# Patient Record
Sex: FEMALE
[De-identification: names, ages, dates, MRNs, and addresses within clinical notes are randomized per-mention and may not be internally consistent; named-entity substitution may affect disease eponyms.]

---

## 2021-08-03 ENCOUNTER — RX ONLY (RX ONLY)
Age: 31
End: 2021-08-03

## 2021-08-03 ENCOUNTER — NEW SKIN PROBLEM (OUTPATIENT)
Dept: URBAN - METROPOLITAN AREA CLINIC 7 | Facility: CLINIC | Age: 31
Setting detail: DERMATOLOGY
End: 2021-08-03

## 2021-08-03 DIAGNOSIS — D48.5 NEOPLASM OF UNCERTAIN BEHAVIOR OF SKIN: ICD-10-CM

## 2021-08-03 PROCEDURE — 99204 OFFICE O/P NEW MOD 45 MIN: CPT

## 2021-08-03 RX ORDER — KETOCONAZOLE 20 MG/G
CREAM TOPICAL
Qty: 60 | Refills: 4
Start: 2021-08-03

## 2021-08-03 RX ORDER — BETAMETHASONE DIPROPIONATE 0.5 MG/G
CREAM TOPICAL
Qty: 45 | Refills: 0
Start: 2021-08-03

## 2022-02-08 ENCOUNTER — FOLLOW-UP (OUTPATIENT)
Dept: URBAN - METROPOLITAN AREA CLINIC 9 | Facility: CLINIC | Age: 32
Setting detail: DERMATOLOGY
End: 2022-02-08

## 2022-02-08 DIAGNOSIS — L57.0 ACTINIC KERATOSIS: ICD-10-CM

## 2022-02-08 PROCEDURE — 99214 OFFICE O/P EST MOD 30 MIN: CPT

## 2023-03-21 ENCOUNTER — APPOINTMENT (OUTPATIENT)
Dept: URBAN - METROPOLITAN AREA CLINIC 214 | Age: 33
Setting detail: DERMATOLOGY
End: 2023-03-24

## 2023-03-21 DIAGNOSIS — L60.3 NAIL DYSTROPHY: ICD-10-CM

## 2023-03-21 PROCEDURE — 99212 OFFICE O/P EST SF 10 MIN: CPT

## 2023-03-21 PROCEDURE — OTHER MIPS QUALITY: OTHER

## 2023-03-21 PROCEDURE — OTHER PRESCRIPTION: OTHER

## 2023-03-21 PROCEDURE — OTHER COUNSELING: OTHER

## 2023-03-21 RX ORDER — CICLOPIROX 80 MG/ML
SOLUTION TOPICAL
Qty: 6.6 | Refills: 3 | Status: ERX | COMMUNITY
Start: 2023-03-21

## 2023-03-21 ASSESSMENT — LOCATION DETAILED DESCRIPTION DERM: LOCATION DETAILED: LEFT THUMBNAIL

## 2023-03-21 ASSESSMENT — LOCATION SIMPLE DESCRIPTION DERM: LOCATION SIMPLE: LEFT THUMBNAIL

## 2023-03-21 ASSESSMENT — LOCATION ZONE DERM: LOCATION ZONE: FINGERNAIL

## 2023-03-21 ASSESSMENT — NAIL INVOLVEMENT PERCENT: % OF NAIL(S) INVOLVED: 10

## 2023-03-21 NOTE — HPI: NAIL DISORDER
Is This A New Presentation, Or A Follow-Up?: Nail Disorder
How Severe Is It?: moderate
Additional History: Patient stated she has not been treating, patient was supposed to get ciclopirox previously but it was not sent to pharmacy

## 2024-03-28 ENCOUNTER — APPOINTMENT (OUTPATIENT)
Dept: URBAN - METROPOLITAN AREA CLINIC 214 | Age: 34
Setting detail: DERMATOLOGY
End: 2024-03-28

## 2024-03-28 DIAGNOSIS — L60.3 NAIL DYSTROPHY: ICD-10-CM

## 2024-03-28 DIAGNOSIS — L23.9 ALLERGIC CONTACT DERMATITIS, UNSPECIFIED CAUSE: ICD-10-CM

## 2024-03-28 PROCEDURE — 99213 OFFICE O/P EST LOW 20 MIN: CPT

## 2024-03-28 PROCEDURE — OTHER COUNSELING: OTHER

## 2024-03-28 PROCEDURE — OTHER PRESCRIPTION MEDICATION MANAGEMENT: OTHER

## 2024-03-28 PROCEDURE — OTHER MIPS QUALITY: OTHER

## 2024-03-28 PROCEDURE — OTHER PRESCRIPTION: OTHER

## 2024-03-28 RX ORDER — FLUTICASONE PROPIONATE 0.05 MG/G
OINTMENT TOPICAL
Qty: 30 | Refills: 0 | Status: ERX | COMMUNITY
Start: 2024-03-28

## 2024-03-28 RX ORDER — CICLOPIROX 80 MG/ML
SOLUTION TOPICAL
Qty: 6.6 | Refills: 1 | Status: ERX

## 2024-03-28 ASSESSMENT — LOCATION DETAILED DESCRIPTION DERM: LOCATION DETAILED: LEFT THUMBNAIL

## 2024-03-28 ASSESSMENT — LOCATION ZONE DERM: LOCATION ZONE: FINGERNAIL

## 2024-03-28 ASSESSMENT — LOCATION SIMPLE DESCRIPTION DERM: LOCATION SIMPLE: LEFT THUMBNAIL

## 2024-03-28 NOTE — PROCEDURE: PRESCRIPTION MEDICATION MANAGEMENT
Detail Level: Zone
Initiate Treatment: ciclopirox 8 % topical solution \\nQuantity: 6.6 ml  Days Supply: 30\\nSig: Apply to affected nail daily
Render In Strict Bullet Format?: No
Initiate Treatment: fluticasone propionate 0.005 % topical ointment \\nQuantity: 30.0 g  Days Supply: 30\\nSig: Apply to affected area qh
5